# Patient Record
Sex: FEMALE | Race: WHITE
[De-identification: names, ages, dates, MRNs, and addresses within clinical notes are randomized per-mention and may not be internally consistent; named-entity substitution may affect disease eponyms.]

---

## 2017-08-09 ENCOUNTER — HOSPITAL ENCOUNTER (OUTPATIENT)
Dept: HOSPITAL 75 - LAB | Age: 47
End: 2017-08-09
Attending: NURSE PRACTITIONER
Payer: COMMERCIAL

## 2017-08-09 DIAGNOSIS — E03.9: ICD-10-CM

## 2017-08-09 DIAGNOSIS — E78.2: Primary | ICD-10-CM

## 2017-08-09 LAB
ALBUMIN SERPL-MCNC: 3.9 GM/DL (ref 3.2–4.5)
ALT SERPL-CCNC: 26 U/L (ref 0–55)
ANION GAP SERPL CALC-SCNC: 9 MMOL/L (ref 5–14)
AST SERPL-CCNC: 28 U/L (ref 5–34)
BILIRUB SERPL-MCNC: 0.8 MG/DL (ref 0.1–1)
BUN SERPL-MCNC: 11 MG/DL (ref 7–18)
BUN/CREAT SERPL: 14
CALCIUM SERPL-MCNC: 9.2 MG/DL (ref 8.5–10.1)
CHLORIDE SERPL-SCNC: 103 MMOL/L (ref 98–107)
CHOLEST SERPL-MCNC: 206 MG/DL (ref ?–200)
CO2 SERPL-SCNC: 26 MMOL/L (ref 21–32)
CREAT SERPL-MCNC: 0.8 MG/DL (ref 0.6–1.3)
GFR SERPLBLD BASED ON 1.73 SQ M-ARVRAT: > 60 ML/MIN
GLUCOSE SERPL-MCNC: 105 MG/DL (ref 70–105)
LDLC SERPL DIRECT ASSAY-MCNC: 151 MG/DL (ref 1–129)
POTASSIUM SERPL-SCNC: 4.3 MMOL/L (ref 3.6–5)
PROT SERPL-MCNC: 7.2 GM/DL (ref 6.4–8.2)
SODIUM SERPL-SCNC: 138 MMOL/L (ref 135–145)
TRIGL SERPL-MCNC: 138 MG/DL (ref ?–150)
TSH SERPL-ACNC: 3.54 UIU/ML (ref 0.35–4.94)
VLDLC SERPL CALC-MCNC: 28 MG/DL (ref 5–40)

## 2017-08-09 PROCEDURE — 84443 ASSAY THYROID STIM HORMONE: CPT

## 2017-08-09 PROCEDURE — 80061 LIPID PANEL: CPT

## 2017-08-09 PROCEDURE — 36415 COLL VENOUS BLD VENIPUNCTURE: CPT

## 2017-08-09 PROCEDURE — 80053 COMPREHEN METABOLIC PANEL: CPT

## 2017-11-19 ENCOUNTER — HOSPITAL ENCOUNTER (EMERGENCY)
Dept: HOSPITAL 75 - ER | Age: 47
Discharge: HOME | End: 2017-11-19
Payer: COMMERCIAL

## 2017-11-19 VITALS — DIASTOLIC BLOOD PRESSURE: 90 MMHG | SYSTOLIC BLOOD PRESSURE: 137 MMHG

## 2017-11-19 VITALS — SYSTOLIC BLOOD PRESSURE: 136 MMHG | DIASTOLIC BLOOD PRESSURE: 98 MMHG

## 2017-11-19 VITALS — BODY MASS INDEX: 40.46 KG/M2 | WEIGHT: 237 LBS | HEIGHT: 64 IN

## 2017-11-19 DIAGNOSIS — Z82.49: ICD-10-CM

## 2017-11-19 DIAGNOSIS — E03.9: ICD-10-CM

## 2017-11-19 DIAGNOSIS — R63.0: ICD-10-CM

## 2017-11-19 DIAGNOSIS — E87.8: Primary | ICD-10-CM

## 2017-11-19 LAB
ALBUMIN SERPL-MCNC: 4.2 GM/DL (ref 3.2–4.5)
ALT SERPL-CCNC: 27 U/L (ref 0–55)
ANION GAP SERPL CALC-SCNC: 14 MMOL/L (ref 5–14)
AST SERPL-CCNC: 23 U/L (ref 5–34)
BASOPHILS # BLD AUTO: 0 10^3/UL (ref 0–0.1)
BASOPHILS NFR BLD AUTO: 0 % (ref 0–10)
BILIRUB SERPL-MCNC: 0.8 MG/DL (ref 0.1–1)
BILIRUB UR QL STRIP: NEGATIVE
BUN SERPL-MCNC: 11 MG/DL (ref 7–18)
BUN/CREAT SERPL: 14
CALCIUM SERPL-MCNC: 9.5 MG/DL (ref 8.5–10.1)
CHLORIDE SERPL-SCNC: 101 MMOL/L (ref 98–107)
CO2 SERPL-SCNC: 22 MMOL/L (ref 21–32)
CREAT SERPL-MCNC: 0.8 MG/DL (ref 0.6–1.3)
EOSINOPHIL # BLD AUTO: 0.1 10^3/UL (ref 0–0.3)
EOSINOPHIL NFR BLD AUTO: 1 % (ref 0–10)
ERYTHROCYTE [DISTWIDTH] IN BLOOD BY AUTOMATED COUNT: 14.1 % (ref 10–14.5)
GFR SERPLBLD BASED ON 1.73 SQ M-ARVRAT: > 60 ML/MIN
GLUCOSE SERPL-MCNC: 109 MG/DL (ref 70–105)
KETONES UR QL STRIP: NEGATIVE
LEUKOCYTE ESTERASE UR QL STRIP: (no result)
LYMPHOCYTES # BLD AUTO: 1.6 X 10^3 (ref 1–4)
LYMPHOCYTES NFR BLD AUTO: 16 % (ref 12–44)
MCH RBC QN AUTO: 29 PG (ref 25–34)
MCHC RBC AUTO-ENTMCNC: 33 G/DL (ref 32–36)
MCV RBC AUTO: 89 FL (ref 80–99)
MONOCYTES # BLD AUTO: 0.6 X 10^3 (ref 0–1)
MONOCYTES NFR BLD AUTO: 6 % (ref 0–12)
NEUTROPHILS # BLD AUTO: 7.8 X 10^3 (ref 1.8–7.8)
NEUTROPHILS NFR BLD AUTO: 77 % (ref 42–75)
NITRITE UR QL STRIP: NEGATIVE
PH UR STRIP: 6 [PH] (ref 5–9)
PLATELET # BLD: 231 10^3/UL (ref 130–400)
PMV BLD AUTO: 10.3 FL (ref 7.4–10.4)
POTASSIUM SERPL-SCNC: 3.9 MMOL/L (ref 3.6–5)
PROT SERPL-MCNC: 7.9 GM/DL (ref 6.4–8.2)
PROT UR QL STRIP: NEGATIVE
RBC # BLD AUTO: 4.72 10^6/UL (ref 4.35–5.85)
SODIUM SERPL-SCNC: 137 MMOL/L (ref 135–145)
SP GR UR STRIP: 1.01 (ref 1.02–1.02)
TSH SERPL-ACNC: 3.16 UIU/ML (ref 0.35–4.94)
UROBILINOGEN UR-MCNC: NORMAL MG/DL
WBC # BLD AUTO: 10.2 10^3/UL (ref 4.3–11)
WBC #/AREA URNS HPF: (no result) /HPF

## 2017-11-19 PROCEDURE — 36415 COLL VENOUS BLD VENIPUNCTURE: CPT

## 2017-11-19 PROCEDURE — 93041 RHYTHM ECG TRACING: CPT

## 2017-11-19 PROCEDURE — 80053 COMPREHEN METABOLIC PANEL: CPT

## 2017-11-19 PROCEDURE — 70498 CT ANGIOGRAPHY NECK: CPT

## 2017-11-19 PROCEDURE — 84439 ASSAY OF FREE THYROXINE: CPT

## 2017-11-19 PROCEDURE — 84443 ASSAY THYROID STIM HORMONE: CPT

## 2017-11-19 PROCEDURE — 70496 CT ANGIOGRAPHY HEAD: CPT

## 2017-11-19 PROCEDURE — 81000 URINALYSIS NONAUTO W/SCOPE: CPT

## 2017-11-19 PROCEDURE — 85025 COMPLETE CBC W/AUTO DIFF WBC: CPT

## 2017-11-19 PROCEDURE — 93005 ELECTROCARDIOGRAM TRACING: CPT

## 2017-11-19 NOTE — ED GENERAL
General


Chief Complaint:  Dizziness/Syncope


Stated Complaint:  NAUSEA/DIZZINESS/ODD URINE ODOR


Nursing Triage Note:  


AMB TO ROOM REPORTS HAS BEEN DIZZY SINCE MONDAY .  AND LAST 2 DAYS NOTICED 

URINE 


SMELLING LIKE FISH.


Nursing Sepsis Screen:  No Definite Risk


Source of Information:  Patient


Exam Limitations:  No Limitations





History of Present Illness


Time Seen by Provider:  13:34


Initial Comments


This 47-year-old woman presents to the emergency room with complaints of 

feeling ill since November 13.  Symptoms include a strong odor to her urine, 

questionable fever, nausea, loose stools, lower back pain, weakness, poor 

appetite, and lightheadedness particularly upon standing without vertigo.





Allergies and Home Medications


Allergies


Coded Allergies:  


     No Known Drug Allergies (Unverified , 6/28/10)





Home Medications


Levothyroxine Sodium 25 Mcg Tablet, 1 EACH PO DAILY, (Reported)


Promethazine HCl 25 Mg Tablet, 25 MG PO Q6H PRN for NAUSEA/VOMITING, #10


   Prescribed by: MARIBEL LOPEZ on 11/19/17 2030





Constitutional:  see HPI


EENTM:  no symptoms reported


Respiratory:  no symptoms reported


Cardiovascular:  see HPI


Gastrointestinal:  see HPI


Genitourinary:  see HPI


Pregnant:  No


Musculoskeletal:  see HPI


Skin:  no symptoms reported


Psychiatric/Neurological:  See HPI


Hematologic/Lymphatic:  No Symptoms Reported


Immunological/Allergic:  no symptoms reported





Past Medical-Social-Family Hx


Patient Social History


Alcohol Use:  Denies Use


Recreational Drug Use:  No


Smoking Status:  Never a Smoker


Recent Foreign Travel:  No


Contact w/Someone Who Travel:  No


Recent Infectious Disease Expo:  No





Surgeries


History of Surgeries:  Yes (wisdom teeth)


Surgeries:  Orthopedic





Respiratory


History of Respiratory Disorde:  No





Cardiovascular


History of Cardiac Disorders:  No





Neurological


History of Neurological Disord:  No





Reproductive System


Hx Reproductive Disorders:  No


Sexually Transmitted Disease:  No





Genitourinary


History of Genitourinary Disor:  No





Gastrointestinal


History of Gastrointestinal Di:  No





Musculoskeletal


History of Musculoskeletal Dis:  Yes (LEFT KNEE)





Endocrine


History of Endocrine Disorders:  Yes


Endocrine Disorders:  Hypothyroidsim





HEENT


History of HEENT Disorders:  No





Cancer


History of Cancer:  No





Psychosocial


History of Psychiatric Problem:  No





Blood Transfusions


History of Blood Disorders:  No





Family Medical History


Significant Family History:  Heart Disease, Hypertension, Renal Disease (PKD), 

Other Conditions/Hx (hypothyroidism, Waldenstrom)





Physical Exam


Vital Signs





Vital Sign - Last 12Hours








 11/19/17





 13:48


 


Temp 97.9


 


Pulse 61


 


Resp 18


 


B/P (MAP) 148/109


 


Pulse Ox 99


 


O2 Delivery Room Air





Capillary Refill : Less Than 3 Seconds


General Appearance:  No Apparent Distress, WD/WN


HEENT:  PERRL/EOMI, Normal ENT Inspection, Pharynx Normal


Neck:  Normal Inspection, Supple, No Carotid Bruit


Respiratory:  Lungs Clear, Normal Breath Sounds, No Accessory Muscle Use, No 

Respiratory Distress


Cardiovascular:  Regular Rate, Rhythm, No Edema, No Murmur


Gastrointestinal:  Normal Bowel Sounds, Non Tender, Soft


Extremity:  Normal Inspection, Non Tender, No Pedal Edema


Neurologic/Psychiatric:  Alert, Oriented x3, No Motor/Sensory Deficits, Normal 

Mood/Affect, CNs II-XII Norm as Tested, Other (disequilibrium with standing and 

ambulation.  Normal finger to nose and heel to shin)


Skin:  Normal Color, Warm/Dry





Progress/Results/Core Measures


Suspected Sepsis


Recent Fever Within 48 Hours:  No


Infection Criteria Present:  None


New/Unexplained  Altered Menta:  No


Sepsis Screen:  No Definite Risk


Sepsis Diagnosis:  


SIRS


Temperature:97.9 


Pulse: 61 


Respiratory Rate: 18


 


Laboratory Tests


11/19/17 14:19: White Blood Count 10.2


Blood Pressure 148 /109 


Mean: 122


 


Laboratory Tests


11/19/17 14:19: 


Creatinine 0.80, Platelet Count 231, Total Bilirubin 0.8








Results/Orders


Lab Results





Laboratory Tests








Test


  11/19/17


14:12 11/19/17


14:19 Range/Units


 


 


Urine Color YELLOW    


 


Urine Clarity CLEAR    


 


Urine pH 6   5-9  


 


Urine Specific Gravity 1.010 L  1.016-1.022  


 


Urine Protein NEGATIVE   NEGATIVE  


 


Urine Glucose (UA) NEGATIVE   NEGATIVE  


 


Urine Ketones NEGATIVE   NEGATIVE  


 


Urine Nitrite NEGATIVE   NEGATIVE  


 


Urine Bilirubin NEGATIVE   NEGATIVE  


 


Urine Urobilinogen NORMAL   NORMAL  MG/DL


 


Urine Leukocyte Esterase 2+ H  NEGATIVE  


 


Urine RBC (Auto) NEGATIVE   NEGATIVE  


 


Urine RBC NONE    /HPF


 


Urine WBC 2-5    /HPF


 


Urine Squamous Epithelial


Cells 10-25 H


  


   /HPF


 


 


Urine Crystals NONE    /LPF


 


Urine Bacteria FEW H   /HPF


 


Urine Casts NONE    /LPF


 


Urine Mucus NEGATIVE    /LPF


 


Urine Culture Indicated NO    


 


White Blood Count


  


  10.2 


  4.3-11.0


10^3/uL


 


Red Blood Count


  


  4.72 


  4.35-5.85


10^6/uL


 


Hemoglobin  13.9  11.5-16.0  G/DL


 


Hematocrit  42  35-52  %


 


Mean Corpuscular Volume  89  80-99  FL


 


Mean Corpuscular Hemoglobin  29  25-34  PG


 


Mean Corpuscular Hemoglobin


Concent 


  33 


  32-36  G/DL


 


 


Red Cell Distribution Width  14.1  10.0-14.5  %


 


Platelet Count


  


  231 


  130-400


10^3/uL


 


Mean Platelet Volume  10.3  7.4-10.4  FL


 


Neutrophils (%) (Auto)  77 H 42-75  %


 


Lymphocytes (%) (Auto)  16  12-44  %


 


Monocytes (%) (Auto)  6  0-12  %


 


Eosinophils (%) (Auto)  1  0-10  %


 


Basophils (%) (Auto)  0  0-10  %


 


Neutrophils # (Auto)  7.8  1.8-7.8  X 10^3


 


Lymphocytes # (Auto)  1.6  1.0-4.0  X 10^3


 


Monocytes # (Auto)  0.6  0.0-1.0  X 10^3


 


Eosinophils # (Auto)


  


  0.1 


  0.0-0.3


10^3/uL


 


Basophils # (Auto)


  


  0.0 


  0.0-0.1


10^3/uL


 


Sodium Level  137  135-145  MMOL/L


 


Potassium Level  3.9  3.6-5.0  MMOL/L


 


Chloride Level  101    MMOL/L


 


Carbon Dioxide Level  22  21-32  MMOL/L


 


Anion Gap  14  5-14  MMOL/L


 


Blood Urea Nitrogen  11  7-18  MG/DL


 


Creatinine


  


  0.80 


  0.60-1.30


MG/DL


 


Estimat Glomerular Filtration


Rate 


  > 60 


   


 


 


BUN/Creatinine Ratio  14   


 


Glucose Level  109 H   MG/DL


 


Calcium Level  9.5  8.5-10.1  MG/DL


 


Total Bilirubin  0.8  0.1-1.0  MG/DL


 


Aspartate Amino Transf


(AST/SGOT) 


  23 


  5-34  U/L


 


 


Alanine Aminotransferase


(ALT/SGPT) 


  27 


  0-55  U/L


 


 


Alkaline Phosphatase  89    U/L


 


Total Protein  7.9  6.4-8.2  GM/DL


 


Albumin  4.2  3.2-4.5  GM/DL


 


Thyroid Stimulating Hormone


(TSH) 


  3.16 


  0.35-4.94


UIU/ML


 


Free Thyroxine


  


  0.98 


  0.70-1.48


NG/DL








My Orders





Orders - MARIBEL KENNEY MD


Ua Culture If Indicated (11/19/17 13:34)


Saline Lock/Iv-Start (11/19/17 13:58)


Ekg Tracing (11/19/17 13:58)


Monitor-Rhythm Ecg Trace Only (11/19/17 13:58)


Cbc With Automated Diff (11/19/17 13:58)


Comprehensive Metabolic Panel (11/19/17 13:58)


Thyroid Stimulating Hormone (11/19/17 13:58)


Free T4 (Free Thyroxine) (11/19/17 13:58)


Ns Iv 1000 Ml (Sodium Chloride 0.9%) (11/19/17 15:33)


Ct Angio Head/Neck (11/19/17 17:09)


Iohexol Injection (Omnipaque 350 Mg/Ml 1 (11/19/17 17:15)


Ns (Ivpb) (Sodium Chloride 0.9% Ivpb Bag (11/19/17 17:15)


Pharmacy Communication (Pharmacy Communi (11/19/17 17:15)


Ns Iv 1000 Ml (Sodium Chloride 0.9%) (11/19/17 18:10)





Medications Given in ED





Current Medications








 Medications  Dose


 Ordered  Sig/Skip


 Route  Start Time


 Stop Time Status Last Admin


Dose Admin


 


 Iohexol  100 ml  ONCE  ONCE


 IV  11/19/17 17:15


 11/19/17 17:16 DC 11/19/17 17:56


85 ML


 


 Sodium Chloride  100 ml  ONCE  ONCE


 IV  11/19/17 17:15


 11/19/17 17:16 DC 11/19/17 17:56


80 ML


 


 Sodium Chloride  1,000 ml @ 


 0 mls/hr  Q0M ONCE


 IV  11/19/17 15:33


 11/19/17 15:35 DC 11/19/17 15:44


1,000 MLS/HR


 


 Sodium Chloride  1,000 ml @ 


 0 mls/hr  Q0M ONCE


 IV  11/19/17 18:10


 11/19/17 18:12 DC 11/19/17 18:16


1,000 MLS/HR








Vital Signs/I&O





Vital Sign - Last 12Hours








 11/19/17 11/19/17





 13:48 14:42


 


Temp 97.9 


 


Pulse 61 86


 


Resp 18 18


 


B/P (MAP) 148/109 136/98


 


Pulse Ox 99 99


 


O2 Delivery Room Air Room Air














Intake and Output 


 


 11/20/17





 00:00


 


Intake Total 1000 ml


 


Balance 1000 ml





Capillary Refill : Less Than 3 Seconds








Blood Pressure Mean:  122








Progress Note #1:  


   Time:  15:35


Progress Note


Workup was unremarkable.  Orthostatic blood pressures failed to reviewed reveal 

any orthostatic hypotension.  Measurement of blood pressures was difficult as 

the cuff would not repeat on the upper arm.  A radial cuff was used.  Patient 

did have significant lightheadedness upon standing.  A trial of IV fluids will 

be administered and patient will be asked to try standing again after infusion 

is complete.


Progress Note #2:  


   Time:  17:09


Progress Note


Patient still has a lightheadedness upon sitting and standing.  This persists 

even after a liter of IV fluids.  Blood pressures during sitting and standing 

showed no orthostatic changes.  Patient states when she is walking at home she 

feels a disequilibrium as if walking on a boat or being off balance.  She 

denies any spinning or vertigo-like dizziness.  CT angiogram of the head and 

neck has been ordered to evaluate for posterior circulation defects or carotid 

disease.


Progress Note #3:  


   Time:  18:43


Progress Note


CT angiogram of the head and neck was unremarkable.  [place]-Hallpike maneuver was 

performed and was negative.  Patient's symptoms may be simply related to a 

viral labyrinthitis.  She is working on a second liter of IV fluids.  She will 

be discharged with symptomatic management and outpatient follow-up.





ECG


Initial ECG Impression Date:  Nov 19, 2017


Initial ECG Impression Time:  14:06


Initial ECG Rate:  62


Initial ECG Rhythm:  Normal Sinus


Initial ECG Intervals:  Normal


Initial ECG Impression:  Normal


Comment


Normal sinus rhythm with no ST elevation or depression.  No abnormal intervals 

or axis deviation.





Departure


Impression


Impression:  


 Primary Impression:  


 Disequilibrium


 Additional Impressions:  


 Weakness


 Loss of appetite


 Nausea


Disposition:  01 HOME, SELF-CARE


Condition:  Stable





Departure-Patient Inst.


Decision time for Depature:  18:30


Referrals:  


JAZMYN DURBIN DO (PCP)


Primary Care Physician








ДМИТРИЙ DENNIS (Family)


Primary Care Physician


Patient Instructions:  NO INSTRUCTIONS GIVEN





Add. Discharge Instructions:  


Discontinue sinus and decongestant medications.  Follow-up with your primary 

care provider soon as possible.  Return to emergency room if symptoms worsen.  

You may use Phenergan (promethazine) as prescribed for nausea.  You may use 

meclizine per package instructions for dizziness/disequilibrium.











All discharge instructions reviewed with patient and/or family. Voiced 

understanding.


Scripts


Promethazine HCl (Promethazine Tablet) 25 Mg Tablet


25 MG PO Q6H Y for NAUSEA/VOMITING, #10 TAB


   Prov: BRUEGGEMANN,MARIBEL T MD         11/19/17





Copy


Copies To 1:   JAZMYN DURBIN JOSHUA T MD Nov 19, 2017 14:45

## 2017-11-19 NOTE — DIAGNOSTIC IMAGING REPORT
PROCEDURE: CT angiography of the head and CT angiography of the

neck with and without contrast.



TECHNIQUE: Contiguous noncontrast images were obtained from the

skull base through the vertex. After intravenous contrast

administration, helical CT angiography of the neck was performed.

Source data was reformatted into multiple MIP projections.

Delayed post contrast acquisition was also obtained.



INDICATION: Dizziness. Weakness. Skull fracture. 



COMPARISON: None.



FINDINGS: Noncontrast head CT demonstrates no intracranial

hemorrhage, mass effect, hydrocephalus or extra axial fluid

collections. No CT evidence of acute infarction. Osseous

structures are intact. The paranasal sinuses and mastoids are

clear. Delayed post contrast imaging demonstrates no abnormal

intracranial enhancement.



CTA demonstrates a conventional aortic arch. The basilar,

bilateral carotid, vertebral, anterior cerebral, middle cerebral

and posterior cerebral arteries are widely patent without

evidence of aneurysm. Codominant vertebral arteries. The

superior, anterior inferior and posterior inferior cerebellar

artery origins are patent. The dural venous sinuses are grossly

patent on this nondedicated exam.



No acute CT findings in the cervical spine. No evidence of

high-grade spinal canal narrowing. The visualized soft tissues of

the neck are unremarkable.



IMPRESSION: No high-grade narrowing or aneurysm involving the

major arteries in the head and neck. No acute intracranial CT

findings.



Dictated by: 



  Dictated on workstation # [id number]

## 2017-11-28 ENCOUNTER — HOSPITAL ENCOUNTER (OUTPATIENT)
Dept: HOSPITAL 75 - RAD | Age: 47
End: 2017-11-28
Attending: NURSE PRACTITIONER
Payer: COMMERCIAL

## 2017-11-28 DIAGNOSIS — Z12.31: Primary | ICD-10-CM

## 2017-11-28 PROCEDURE — 77067 SCR MAMMO BI INCL CAD: CPT

## 2017-11-29 NOTE — DIAGNOSTIC IMAGING REPORT
Bilateral screening mammogram 2D views with tomosynthesis



The current study was also evaluated with a Computer Aided

Detection (CAD) system.



Indication: Screening. No current complaints stated on the

questionnaire.



COMPARISON: 01/10/2012.



FINDINGS:

The breasts are composed of scattered fibroglandular densities.

Scattered benign-appearing calcifications are seen. Allowing for

technique and positional differences, no suspicious change is

seen.



IMPRESSION: No significant change.



ACR BI-RADS Category 2: Benign findings.

Result letter will be mailed to the patient.

Note: At least 10% of breast cancer is not imaged by mammography.



 



Dictated by: 



  Dictated on workstation # [id number]

## 2018-04-14 ENCOUNTER — HOSPITAL ENCOUNTER (EMERGENCY)
Dept: HOSPITAL 75 - ER | Age: 48
Discharge: HOME | End: 2018-04-14
Payer: COMMERCIAL

## 2018-04-14 VITALS — WEIGHT: 280 LBS | BODY MASS INDEX: 47.8 KG/M2 | HEIGHT: 64 IN

## 2018-04-14 VITALS — SYSTOLIC BLOOD PRESSURE: 139 MMHG | DIASTOLIC BLOOD PRESSURE: 97 MMHG

## 2018-04-14 DIAGNOSIS — Z87.39: ICD-10-CM

## 2018-04-14 DIAGNOSIS — Z98.890: ICD-10-CM

## 2018-04-14 DIAGNOSIS — J18.1: Primary | ICD-10-CM

## 2018-04-14 DIAGNOSIS — E03.9: ICD-10-CM

## 2018-04-14 LAB
ALBUMIN SERPL-MCNC: 4.3 GM/DL (ref 3.2–4.5)
ALP SERPL-CCNC: 73 U/L (ref 40–136)
ALT SERPL-CCNC: 35 U/L (ref 0–55)
BASOPHILS # BLD AUTO: 0.1 10^3/UL (ref 0–0.1)
BASOPHILS NFR BLD AUTO: 1 % (ref 0–10)
BILIRUB SERPL-MCNC: 0.7 MG/DL (ref 0.1–1)
BUN/CREAT SERPL: 15
CALCIUM SERPL-MCNC: 9.7 MG/DL (ref 8.5–10.1)
CHLORIDE SERPL-SCNC: 102 MMOL/L (ref 98–107)
CO2 SERPL-SCNC: 26 MMOL/L (ref 21–32)
CREAT SERPL-MCNC: 0.75 MG/DL (ref 0.6–1.3)
EOSINOPHIL # BLD AUTO: 0.2 10^3/UL (ref 0–0.3)
EOSINOPHIL NFR BLD AUTO: 3 % (ref 0–10)
ERYTHROCYTE [DISTWIDTH] IN BLOOD BY AUTOMATED COUNT: 14.3 % (ref 10–14.5)
GFR SERPLBLD BASED ON 1.73 SQ M-ARVRAT: > 60 ML/MIN
GLUCOSE SERPL-MCNC: 103 MG/DL (ref 70–105)
HCT VFR BLD CALC: 40 % (ref 35–52)
HGB BLD-MCNC: 13.1 G/DL (ref 11.5–16)
LYMPHOCYTES # BLD AUTO: 2 X 10^3 (ref 1–4)
LYMPHOCYTES NFR BLD AUTO: 26 % (ref 12–44)
MANUAL DIFFERENTIAL PERFORMED BLD QL: NO
MCH RBC QN AUTO: 29 PG (ref 25–34)
MCHC RBC AUTO-ENTMCNC: 33 G/DL (ref 32–36)
MCV RBC AUTO: 89 FL (ref 80–99)
MONOCYTES # BLD AUTO: 0.5 X 10^3 (ref 0–1)
MONOCYTES NFR BLD AUTO: 6 % (ref 0–12)
NEUTROPHILS # BLD AUTO: 5.1 X 10^3 (ref 1.8–7.8)
NEUTROPHILS NFR BLD AUTO: 65 % (ref 42–75)
PLATELET # BLD: 276 10^3/UL (ref 130–400)
PMV BLD AUTO: 9.8 FL (ref 7.4–10.4)
POTASSIUM SERPL-SCNC: 4.3 MMOL/L (ref 3.6–5)
PROT SERPL-MCNC: 8 GM/DL (ref 6.4–8.2)
RBC # BLD AUTO: 4.48 10^6/UL (ref 4.35–5.85)
SODIUM SERPL-SCNC: 139 MMOL/L (ref 135–145)
WBC # BLD AUTO: 7.9 10^3/UL (ref 4.3–11)

## 2018-04-14 PROCEDURE — 36415 COLL VENOUS BLD VENIPUNCTURE: CPT

## 2018-04-14 PROCEDURE — 71046 X-RAY EXAM CHEST 2 VIEWS: CPT

## 2018-04-14 PROCEDURE — 80053 COMPREHEN METABOLIC PANEL: CPT

## 2018-04-14 PROCEDURE — 85025 COMPLETE CBC W/AUTO DIFF WBC: CPT

## 2018-04-14 PROCEDURE — 94640 AIRWAY INHALATION TREATMENT: CPT

## 2018-04-14 PROCEDURE — 87040 BLOOD CULTURE FOR BACTERIA: CPT

## 2018-04-14 PROCEDURE — 96375 TX/PRO/DX INJ NEW DRUG ADDON: CPT

## 2018-04-14 PROCEDURE — 83605 ASSAY OF LACTIC ACID: CPT

## 2018-04-14 PROCEDURE — 96365 THER/PROPH/DIAG IV INF INIT: CPT

## 2018-04-14 PROCEDURE — 71260 CT THORAX DX C+: CPT

## 2018-04-14 NOTE — DIAGNOSTIC IMAGING REPORT
PROCEDURE: CT chest with contrast only.



TECHNIQUE: Multiple contiguous axial images were obtained through

the chest after administration of intravenous contrast.



DATE: 04/14/2018.



COMPARISON: None. 



INDICATION: A 48-year-old female, cough for 3 weeks. Chest pain.



FINDINGS: 

There is a peripheral nodular area of consolidation in the right

lower lobe measuring 6 mm in size on image 31 which may

potentially reflect focal atelectasis versus pulmonary nodule.

There is a 3 mm right lower lobe pulmonary nodule on axial image

40. There is a 2 mm right lower lobe pulmonary nodule on axial

image 30. There is tree in bud nodularity with adjacent hazy

consolidation in the left upper lobe on axial image 25 and

adjacent sequential images. There is a 3 mm noncalcified left

lower lobe pulmonary nodule on axial image 42 which may be

pleurally based. There is no additional focal airspace

consolidation. There is no pneumothorax. There is no pleural

effusion. The central airways are patent.



There is limited evaluation for pulmonary embolus given the

timing of the contrast bolus. There is no large central embolus.

The heart is not enlarged. There is no paracardial effusion.



There are subcentimeter short axis paratracheal lymph nodes.

There is no identified mediastinal, hilar, or axillary lymph node

which specifically meets CT size criteria for adenopathy.



The liver appears very mildly diffusely decreased in attenuation

suggesting mild diffuse fatty infiltration of the liver.

Additional limited evaluation of the visualized portions of the

upper abdomen is unremarkable.



There is no identified acute bony abnormality.



IMPRESSION: 



CT CHEST. 

1. Tree in bud nodularity in the left upper lobe with adjacent

hazy lung opacities most suggestive of bronchiolitis/pneumonia.

Aspiration would be the primary differential diagnostic

consideration. The tree in bud pattern is suggestive of a process

which spreads via endobronchial means.

2. Additional subcentimeter pulmonary nodules. Recommend followup

CT chest in 6 months to evaluate for stability.







Dictated by: 



  Dictated on workstation # [id number]

## 2018-04-14 NOTE — DIAGNOSTIC IMAGING REPORT
EXAMINATION: CHEST (PA AND LATERAL)



CLINICAL INDICATION: 48-year-old female, chest pain. Evaluation

for pneumonia.



COMPARISON: CT chest 04/14/2018.



FINDINGS: Heart size and mediastinal contours are unremarkable.

There are subtle reticulonodular opacities identified in the left

midlung correlating with the findings on the same-day CT chest.

There is no pleural effusion. The right lung is grossly clear.



IMPRESSION: 

1. Reticulonodular opacities in the left midlung correlating with

findings on CT. An infectious etiology is favored. Aspiration

would be the differential diagnostic consideration.

2. No additionally identified acute cardiopulmonary abnormality.



Dictated by: 



  Dictated on workstation # [id number]

## 2018-04-14 NOTE — ED COUGH/URI
General


Stated Complaint:  COUGH


Source:  patient


Exam Limitations:  no limitations





History of Present Illness


Date Seen by Provider:  Apr 14, 2018


Time Seen by Provider:  13:29


Initial Comments


This 48-year-old white female presents with right chest pain secondary to a 

persistent cough that she's had for the last 3 weeks.  The patient is seen her 

caregiver 3 times and treated with over-the-counter cough medication and 

subsequently Azo azithromycin.  There's been no improvement in the cough and 

the right-sided chest pain that she has experienced has progressed.





Her caregiver had indicated that after obtaining a chest x-ray which 

demonstrated an abnormality that she wanted a CT of the chest which she planned 

for next week.  The patient is very concerned and would like to obtain a CT as 

soon as possible.





The patient denies hemoptysis, productive cough, associated headache stiff neck 

or photophobia, abdominal pain, nausea or vomiting.





Allergies and Home Medications


Allergies


Coded Allergies:  


     No Known Drug Allergies (Unverified , 6/28/10)





Home Medications


Levothyroxine Sodium 25 Mcg Tablet, 1 EACH PO DAILY, (Reported)


Promethazine HCl 25 Mg Tablet, 25 MG PO Q6H PRN for NAUSEA/VOMITING


   Prescribed by: MARIBEL LOPEZ on 11/19/17 4482





Patient Home Medication List


Home Medication List Reviewed:  Yes





Review of Systems


Constitutional:  No chills, No fever


EENTM:  no symptoms reported


Respiratory:  see HPI, cough, other (right-sided chest pain with coughing)


Cardiovascular:  chest pain (right-sided chest pain with cough)


Gastrointestinal:  No abdominal pain, No nausea, No vomiting


Genitourinary:  no symptoms reported


Musculoskeletal:  No back pain, No joint pain


Skin:  No rash


Psychiatric/Neurological:  Denies Anxiety, Denies Depressed


Hematologic/Lymphatic:  No Symptoms Reported


Immunological/Allergic:  no symptoms reported





Past Medical-Social-Family Hx


Patient Social History


Recent Foreign Travel:  No


Contact w/Someone Who Travel:  No





Past Medical History


Surgeries:  Yes (wisdom teeth)


Orthopedic


Respiratory:  No


Cardiac:  No


Neurological:  No


Reproductive Disorders:  No


Sexually Transmitted Disease:  No


Genitourinary:  No


Gastrointestinal:  No


Musculoskeletal:  Yes (LEFT KNEE)


Endocrine:  Yes


Hypothyroidsim


HEENT:  No


Cancer:  No


Psychosocial:  No


Blood Disorders:  No





Family Medical History


Reviewed Nursing Family Hx


Heart Disease, Hypertension, Renal Disease, Other Conditions/Hx





Physical Exam


Vital Signs





Vital Signs - First Documented




















Capillary Refill :


General Appearance:  WD/WN, no apparent distress


Eyes:  Bilateral Eye Normal Inspection


HEENT:  normal ENT inspection


Neck:  non-tender, full range of motion, supple


Respiratory:  chest non-tender, lungs clear, other (a persistent nonproductive 

cough is noted on exam.)


Cardiovascular:  regular rate, rhythm


Gastrointestinal:  normal bowel sounds, non tender


Extremities:  normal range of motion, non-tender, normal inspection


Neurologic/Psychiatric:  no motor/sensory deficits, alert, normal mood/affect, 

oriented x 3


Skin:  normal color





Progress/Results/Core Measures


Suspected Sepsis


SIRS


Temperature: 


Pulse:  


Respiratory Rate: 


 


Laboratory Tests


4/14/18 13:28: White Blood Count 7.9


Blood Pressure  / 


Mean: 


 





Laboratory Tests


4/14/18 13:28: 


Creatinine 0.75, Platelet Count 276, Total Bilirubin 0.7








Results/Orders


Lab Results





Laboratory Tests








Test


 4/14/18


13:28 Range/Units


 


 


White Blood Count


 7.9 


 4.3-11.0


10^3/uL


 


Red Blood Count


 4.48 


 4.35-5.85


10^6/uL


 


Hemoglobin 13.1  11.5-16.0  G/DL


 


Hematocrit 40  35-52  %


 


Mean Corpuscular Volume 89  80-99  FL


 


Mean Corpuscular Hemoglobin 29  25-34  PG


 


Mean Corpuscular Hemoglobin


Concent 33 


 32-36  G/DL





 


Red Cell Distribution Width 14.3  10.0-14.5  %


 


Platelet Count


 276 


 130-400


10^3/uL


 


Mean Platelet Volume 9.8  7.4-10.4  FL


 


Neutrophils (%) (Auto) 65  42-75  %


 


Lymphocytes (%) (Auto) 26  12-44  %


 


Monocytes (%) (Auto) 6  0-12  %


 


Eosinophils (%) (Auto) 3  0-10  %


 


Basophils (%) (Auto) 1  0-10  %


 


Neutrophils # (Auto) 5.1  1.8-7.8  X 10^3


 


Lymphocytes # (Auto) 2.0  1.0-4.0  X 10^3


 


Monocytes # (Auto) 0.5  0.0-1.0  X 10^3


 


Eosinophils # (Auto)


 0.2 


 0.0-0.3


10^3/uL


 


Basophils # (Auto)


 0.1 


 0.0-0.1


10^3/uL


 


Sodium Level 139  135-145  MMOL/L


 


Potassium Level 4.3  3.6-5.0  MMOL/L


 


Chloride Level 102    MMOL/L


 


Carbon Dioxide Level 26  21-32  MMOL/L


 


Anion Gap 11  5-14  MMOL/L


 


Blood Urea Nitrogen 11  7-18  MG/DL


 


Creatinine


 0.75 


 0.60-1.30


MG/DL


 


Estimat Glomerular Filtration


Rate > 60 


  





 


BUN/Creatinine Ratio 15   


 


Glucose Level 103    MG/DL


 


Calcium Level 9.7  8.5-10.1  MG/DL


 


Total Bilirubin 0.7  0.1-1.0  MG/DL


 


Aspartate Amino Transf


(AST/SGOT) 40 H


 5-34  U/L





 


Alanine Aminotransferase


(ALT/SGPT) 35 


 0-55  U/L





 


Alkaline Phosphatase 73    U/L


 


Total Protein 8.0  6.4-8.2  GM/DL


 


Albumin 4.3  3.2-4.5  GM/DL








My Orders





Orders - ELISA OLIVERA MD


Ct Chest W (4/14/18 13:24)


Cbc With Automated Diff (4/14/18 13:24)


Comprehensive Metabolic Panel (4/14/18 13:24)


Albuterol/Ipra Inhalation Soln (Duoneb I (4/14/18 13:30)


Svn Sm Volume Nebulizer Rt-Rfs (4/14/18 13:24)


Iohexol Injection (Omnipaque 350 Mg/Ml 1 (4/14/18 13:45)


Sodium Chloride Flush (Catheter Flush Sy (4/14/18 13:45)


Ns (Ivpb) (Sodium Chloride 0.9%) (4/14/18 13:45)


Pharmacy Communication (Pharmacy Communi (4/14/18 13:38)


Chest Pa/Lat (2 View) (4/14/18 14:28)


Methylprednisolone Sod Succ (Solu-Medrol (4/14/18 15:15)


Levofloxacin 750 Mg/150 Ml Iv (Levaquin (4/14/18 15:15)





Medications Given in ED





Current Medications








 Medications  Dose


 Ordered  Sig/Skip


 Route  Start Time


 Stop Time Status Last Admin


Dose Admin


 


 Albuterol/


 Ipratropium  3 ml  ONCE  ONCE


 INH  4/14/18 13:30


 4/14/18 13:31 DC 4/14/18 14:09


3 ML


 


 Iohexol  100 ml  ONCE  ONCE


 IV  4/14/18 13:45


 4/14/18 13:46 DC 4/14/18 13:58


75 ML


 


 Sodium Chloride  10 ml  AS NEEDED  PRN


 IV  4/14/18 13:45


    4/14/18 13:58


10 ML


 


 Sodium Chloride  250 ml  ONCE  ONCE


 IV  4/14/18 13:45


 4/14/18 13:46 DC 4/14/18 13:58


80 ML








Vital Signs/I&O











 4/14/18 4/14/18 4/14/18





 13:31 13:31 14:09


 


Temp 97.1  


 


Pulse 87  


 


Resp 18  


 


B/P (MAP) 154/85 (108)  


 


Pulse Ox 99  97


 


O2 Delivery Room Air Room Air Room Air





Capillary Refill :


Progress Note :  


   Time:  15:32


Progress Note


The patient's white count was unremarkable.  The patient's CT of the chest 

demonstrated infiltrate in the left upper lobe.  I did a chest x-ray to 

facilitate following the patient for her pneumonia.  Although I did not 

appreciate an infiltrate I talked with the radiologist, Dr. Robledo, who said 

that although it was supple there was an infiltrate there.





I discussed the presentation further with the radiologist and he said that he 

did not believe there was any PE present and that this was consistent with 

pneumonia.





I talked with Dr. Cai who recommended that we treat the patient with 

antibiotics steroids and a cough suppressant.  We discussed possibilities and 

she was kind enough to allow me to use Levaquin.  We will give the patient a 5 

day course of prednisone.  Tussionex will be prescribed to suppress cough.  

Patient will follow up on Monday at [facility] with Dr. Cai.





Departure


Communication (Admissions)


Time/Spoke to Consulting Phy:  15:40


Dr. Cai.





Impression





 Primary Impression:  


 Pleurisy


 Additional Impression:  


 Left upper lobe pneumonia


 Qualified Codes:  J18.1 - Lobar pneumonia, unspecified organism


Disposition:  01 HOME, SELF-CARE


Condition:  Improved





Departure-Patient Inst.


Decision time for Depature:  15:40


Referrals:  


JAZMYN DURBIN DO (PCP)


Primary Care Physician








ДМИТРИЙ DENNIS (Family)


Primary Care Physician








TRICIA CAI MD


Patient Instructions:  Pneumonia, Adult (DC)





Add. Discharge Instructions:  


Prednisone, Levaquin, and Tussionex as prescribed.  Close follow-up with Dr. Cai at [facility] on Monday.  Return if any problems or questions.











ELISA OLIVERA MD Apr 14, 2018 13:33